# Patient Record
Sex: MALE | Race: WHITE | NOT HISPANIC OR LATINO | Employment: FULL TIME | URBAN - NONMETROPOLITAN AREA
[De-identification: names, ages, dates, MRNs, and addresses within clinical notes are randomized per-mention and may not be internally consistent; named-entity substitution may affect disease eponyms.]

---

## 2017-01-05 ENCOUNTER — OFFICE VISIT (OUTPATIENT)
Dept: ORTHOPEDIC SURGERY | Facility: CLINIC | Age: 47
End: 2017-01-05

## 2017-01-05 DIAGNOSIS — M77.12 LATERAL EPICONDYLITIS OF LEFT ELBOW: Primary | ICD-10-CM

## 2017-01-05 PROCEDURE — 99213 OFFICE O/P EST LOW 20 MIN: CPT | Performed by: ORTHOPAEDIC SURGERY

## 2017-01-05 NOTE — PROGRESS NOTES
Chief Complaint   Patient presents with   • Left Elbow - Follow-up           HPI The patient is here today for left elbow follow up on his MRI results.   The patient continues to have a lot of pain and discomfort on the medial aspect of the left elbow. He states that simple activities such as lifting heavy objects or even golfing for recreation are very painful for him. The pain is sharp and stabbing in character and radiates down into the proximal bottom of the forearm. He is having some numbness and tingling pertaining to the ulnar nerve as well. His  strength is also getting somewhat compromised. He does not have any signs or symptoms of ulnar nerve subluxation.     The patient’s MRI report is back. It shows medial epicondylitis with some mild thickening of the common flexor tendon origin with some intrasubstance partial tearing. There is a slightly less pronounced lateral epicondylar inflammatory changes. There is no tear of either the ulnar collateral ligament or the lateral collateral ligament complex. I have discussed the MRI findings with the patient in full and great detail.            There were no vitals filed for this visit.        Review of Systems   Constitutional: Negative for activity change, appetite change, fatigue, fever and unexpected weight change.   HENT: Negative for congestion, sneezing and voice change.    Eyes: Negative for visual disturbance.   Respiratory: Negative for cough, chest tightness and wheezing.    Cardiovascular: Negative for chest pain, palpitations and leg swelling.   Gastrointestinal: Negative for abdominal distention, constipation, diarrhea and vomiting.   Endocrine: Negative for polydipsia, polyphagia and polyuria.   Genitourinary: Negative for decreased urine volume, difficulty urinating, dysuria, flank pain and frequency.   Musculoskeletal: Positive for myalgias. Negative for arthralgias, back pain, gait problem, joint swelling, neck pain and neck stiffness.   Skin:  Negative for color change, pallor and wound.   Allergic/Immunologic: Negative for environmental allergies and food allergies.   Neurological: Negative for dizziness, weakness and headaches.   Hematological: Does not bruise/bleed easily.   Psychiatric/Behavioral: Positive for sleep disturbance. Negative for agitation, confusion and decreased concentration. The patient is not nervous/anxious.            Physical Exam   Constitutional: He is oriented to person, place, and time. He appears well-developed and well-nourished.   HENT:   Head: Normocephalic.   Eyes: Conjunctivae are normal. Pupils are equal, round, and reactive to light.   Neck: Normal range of motion. Neck supple. No JVD present.   Cardiovascular: Normal rate, normal heart sounds and intact distal pulses.    Pulmonary/Chest: Effort normal and breath sounds normal. No respiratory distress.   Abdominal: Soft. Bowel sounds are normal. There is no tenderness. There is no guarding.   Musculoskeletal: He exhibits tenderness.   Lymphadenopathy:     He has no cervical adenopathy.   Neurological: He is alert and oriented to person, place, and time. He has normal reflexes.   Skin: Skin is warm and dry.   Psychiatric: His behavior is normal. Judgment and thought content normal.   Vitals reviewed.          Joint/Body Part Specific Exam:  left elbow. medial Relationship of 3 bony points is well preserved. There is no evidence of posterior interosseous nerve palsy. Mild effusion is noted of the elbow. There is no ulnar neuritis. Patient has a lot of pain and tenderness over the lateral/medial aspect of the elbow. Range of motion of the elbow is 0-130 degrees of flexion, 0-90 degrees of pronation, 0-90 degrees of supination. Extension of the wrist against resistance bothers the patient significantly. Radial artery pulses are palpable. Skin and soft tissues are slightly swollen. There is point tenderness over the flexor pronator muscle mass/extensor supinator muscle  mass.      X-RAY Report:        Diagnostics:  MRI reports discussed with patient in detail      Vu was seen today for follow-up.    Diagnoses and all orders for this visit:    Lateral epicondylitis of left elbow  -     Ambulatory Referral to Hand Surgery          Procedures        Plan:     The patient has tried injections and antiinflammatory medication before. He has also tried using a supportive brace and modifying his activities. His symptoms continue to bother him very significantly. The patient wants to be referred to a specialist for possible surgical repair of the defect on the side that he has partial tear. Accordingly, I am referring because of his preference to Dr. Andrew Mata. The possibility of surgical repair of the partial intrasubstance tear discussed with the patient. He understands and accepts that. Home based exercise program. Stretching and strengthening exercises. Avoid repetitive loading. Tablets of Ibuprofen 600 mg, take 1 p.o. b.i.d. p.r.n. pain. Follow up in my office in 3 months. Copies of the MRI report given to the patient.

## 2017-01-05 NOTE — MR AVS SNAPSHOT
Vu Edge   2017 2:00 PM   Office Visit    Dept Phone:  [phone]   Encounter #:  [id number]    Provider:  aPrish Hathaway MD   Department:  [facility]                Your Full Care Plan              Your Updated Medication List          This list is accurate as of: 17  2:49 PM.  Always use your most recent med list.                VOLTAREN 1 % gel gel   Generic drug:  diclofenac               We Performed the Following     Ambulatory Referral to Hand Surgery       You Were Diagnosed With        Codes Comments    Lateral epicondylitis of left elbow    -  Primary ICD-10-CM: M77.12  ICD-9-CM: 726.32       Instructions     None    Patient Instructions History      Upcoming Appointments     Visit Type Date Time Department    FO[facility] UP 2017  2:00 PM [facility]    FOLLOW UP 2017  4:00 PM [facility]      [employer] Signup     [facility] allows you to send messages to your doctor, view your test results, renew your prescriptions, schedule appointments, and more. To sign up, go to [employer] and click on the Sign Up Now link in the New User? box. Enter your divorce[employer] Activation Code exactly as it appears below along with the last four digits of your Social Security Number and your Date of Birth () to complete the sign-up process. If you do not sign up before the expiration date, you must request a new code.    [email or web address] Activation Code: [email or web address]  Expires: 2017  2:49 PM    If you have questions, you can email [email or web address] or call [phone] to talk to our [email or web address] staff. Remember, divorce360 is NOT to be used for urgent needs. For medical emergencies, dial 911.               Other Info from Your Visit           Your Appointments     2017  4:00 PM EDT   Follow Up with Parish Hathaway MD   [facility] (--)    [address] [address]   [phone]           Arrive 15 minutes prior to appointment.              Allergies     No Known Allergies      Reason for Visit     Left Elbow - Follow-up           Vital Signs     Smoking Status                   Never Smoker           Problems and Diagnoses Noted     Tennis elbow

## 2019-01-29 ENCOUNTER — OFFICE VISIT CONVERTED (OUTPATIENT)
Dept: FAMILY MEDICINE CLINIC | Age: 49
End: 2019-01-29
Attending: NURSE PRACTITIONER

## 2019-02-02 ENCOUNTER — HOSPITAL ENCOUNTER (OUTPATIENT)
Dept: OTHER | Facility: HOSPITAL | Age: 49
Discharge: HOME | End: 2019-02-02
Attending: NURSE PRACTITIONER

## 2019-02-02 LAB
ALBUMIN SERPL-MCNC: 4.1 G/DL (ref 3.5–5)
ALBUMIN/GLOB SERPL: 1.5 {RATIO} (ref 1.4–2.6)
ALP SERPL-CCNC: 30 U/L (ref 53–128)
ALT SERPL-CCNC: 22 U/L (ref 10–40)
ANION GAP SERPL CALC-SCNC: 13 MMOL/L (ref 8–19)
AST SERPL-CCNC: 30 U/L (ref 15–50)
BILIRUB SERPL-MCNC: 0.8 MG/DL (ref 0.2–1.3)
BUN SERPL-MCNC: 18 MG/DL (ref 5–25)
BUN/CREAT SERPL: 14 {RATIO} (ref 6–20)
CALCIUM SERPL-MCNC: 8.9 MG/DL (ref 8.7–10.4)
CHLORIDE SERPL-SCNC: 102 MMOL/L (ref 99–111)
CHOLEST SERPL-MCNC: 141 MG/DL (ref 107–200)
CHOLEST/HDLC SERPL: 4 {RATIO} (ref 3–6)
CONV CO2: 27 MMOL/L (ref 22–32)
CONV TOTAL PROTEIN: 6.8 G/DL (ref 6.3–8.2)
CREAT UR-MCNC: 1.3 MG/DL (ref 0.7–1.2)
ERYTHROCYTE [DISTWIDTH] IN BLOOD BY AUTOMATED COUNT: 12.7 % (ref 11.5–14.5)
GFR SERPLBLD BASED ON 1.73 SQ M-ARVRAT: >60 ML/MIN/{1.73_M2}
GLOBULIN UR ELPH-MCNC: 2.7 G/DL (ref 2–3.5)
GLUCOSE SERPL-MCNC: 98 MG/DL (ref 70–99)
HBA1C MFR BLD: 17 G/DL (ref 14–18)
HCT VFR BLD AUTO: 48.6 % (ref 42–52)
HDLC SERPL-MCNC: 35 MG/DL (ref 40–60)
LDLC SERPL CALC-MCNC: 92 MG/DL (ref 70–100)
MCH RBC QN AUTO: 31 PG (ref 27–31)
MCHC RBC AUTO-ENTMCNC: 35 G/DL (ref 33–37)
MCV RBC AUTO: 88.5 FL (ref 80–96)
OSMOLALITY SERPL CALC.SUM OF ELEC: 288 MOSM/KG (ref 273–304)
PLATELET # BLD AUTO: 209 10*3/UL (ref 130–400)
PMV BLD AUTO: 9.6 FL (ref 7.4–10.4)
POTASSIUM SERPL-SCNC: 4.3 MMOL/L (ref 3.5–5.3)
RBC # BLD AUTO: 5.49 10*6/UL (ref 4.7–6.1)
SODIUM SERPL-SCNC: 138 MMOL/L (ref 135–147)
TRIGL SERPL-MCNC: 72 MG/DL (ref 40–150)
TSH SERPL-ACNC: 1.87 M[IU]/L (ref 0.27–4.2)
VLDLC SERPL-MCNC: 14 MG/DL (ref 5–37)
WBC # BLD AUTO: 5.91 10*3/UL (ref 4.8–10.8)

## 2019-03-14 ENCOUNTER — OFFICE VISIT CONVERTED (OUTPATIENT)
Dept: FAMILY MEDICINE CLINIC | Age: 49
End: 2019-03-14
Attending: NURSE PRACTITIONER

## 2020-06-18 ENCOUNTER — OFFICE VISIT CONVERTED (OUTPATIENT)
Dept: FAMILY MEDICINE CLINIC | Age: 50
End: 2020-06-18
Attending: NURSE PRACTITIONER

## 2020-06-18 ENCOUNTER — HOSPITAL ENCOUNTER (OUTPATIENT)
Dept: OTHER | Facility: HOSPITAL | Age: 50
Discharge: HOME | End: 2020-06-18
Attending: NURSE PRACTITIONER

## 2020-06-18 LAB
ALBUMIN SERPL-MCNC: 4.3 G/DL (ref 3.5–5)
ALBUMIN/GLOB SERPL: 1.5 {RATIO} (ref 1.4–2.6)
ALP SERPL-CCNC: 36 U/L (ref 53–128)
ALT SERPL-CCNC: 21 U/L (ref 10–40)
ANION GAP SERPL CALC-SCNC: 18 MMOL/L (ref 8–19)
AST SERPL-CCNC: 28 U/L (ref 15–50)
BASOPHILS # BLD MANUAL: 0.04 10*3/UL (ref 0–0.2)
BASOPHILS NFR BLD MANUAL: 0.7 % (ref 0–3)
BILIRUB SERPL-MCNC: 1.07 MG/DL (ref 0.2–1.3)
BUN SERPL-MCNC: 22 MG/DL (ref 5–25)
BUN/CREAT SERPL: 18 {RATIO} (ref 6–20)
CALCIUM SERPL-MCNC: 9.4 MG/DL (ref 8.7–10.4)
CHLORIDE SERPL-SCNC: 101 MMOL/L (ref 99–111)
CHOLEST SERPL-MCNC: 142 MG/DL (ref 107–200)
CHOLEST/HDLC SERPL: 2.8 {RATIO} (ref 3–6)
CONV CO2: 24 MMOL/L (ref 22–32)
CONV TOTAL PROTEIN: 7.2 G/DL (ref 6.3–8.2)
CREAT UR-MCNC: 1.19 MG/DL (ref 0.7–1.2)
DEPRECATED RDW RBC AUTO: 41.2 FL
EOSINOPHIL # BLD MANUAL: 0.07 10*3/UL (ref 0–0.7)
EOSINOPHIL NFR BLD MANUAL: 1.3 % (ref 0–7)
ERYTHROCYTE [DISTWIDTH] IN BLOOD BY AUTOMATED COUNT: 12.6 % (ref 11.5–14.5)
GFR SERPLBLD BASED ON 1.73 SQ M-ARVRAT: >60 ML/MIN/{1.73_M2}
GLOBULIN UR ELPH-MCNC: 2.9 G/DL (ref 2–3.5)
GLUCOSE SERPL-MCNC: 93 MG/DL (ref 70–99)
GRANS (ABSOLUTE): 3.14 10*3/UL (ref 2–8)
GRANS: 57.7 % (ref 30–85)
HBA1C MFR BLD: 15.6 G/DL (ref 14–18)
HCT VFR BLD AUTO: 44.7 % (ref 42–52)
HDLC SERPL-MCNC: 50 MG/DL (ref 40–60)
IMM GRANULOCYTES # BLD: 0.01 10*3/UL (ref 0–0.54)
IMM GRANULOCYTES NFR BLD: 0.2 % (ref 0–0.43)
LDLC SERPL CALC-MCNC: 78 MG/DL (ref 70–100)
LYMPHOCYTES # BLD MANUAL: 1.66 10*3/UL (ref 1–5)
LYMPHOCYTES NFR BLD MANUAL: 9.6 % (ref 3–10)
MCH RBC QN AUTO: 30.9 PG (ref 27–31)
MCHC RBC AUTO-ENTMCNC: 34.9 G/DL (ref 33–37)
MCV RBC AUTO: 88.5 FL (ref 80–96)
MONOCYTES # BLD AUTO: 0.52 10*3/UL (ref 0.2–1.2)
OSMOLALITY SERPL CALC.SUM OF ELEC: 291 MOSM/KG (ref 273–304)
PLATELET # BLD AUTO: 200 10*3/UL (ref 130–400)
PMV BLD AUTO: 9.1 FL (ref 7.4–10.4)
POTASSIUM SERPL-SCNC: 4.2 MMOL/L (ref 3.5–5.3)
PSA SERPL-MCNC: 0.83 NG/ML (ref 0–4)
RBC # BLD AUTO: 5.05 10*6/UL (ref 4.7–6.1)
SODIUM SERPL-SCNC: 139 MMOL/L (ref 135–147)
TRIGL SERPL-MCNC: 69 MG/DL (ref 40–150)
TSH SERPL-ACNC: 2.72 M[IU]/L (ref 0.27–4.2)
VARIANT LYMPHS NFR BLD MANUAL: 30.5 % (ref 20–45)
VLDLC SERPL-MCNC: 14 MG/DL (ref 5–37)
WBC # BLD AUTO: 5.44 10*3/UL (ref 4.8–10.8)

## 2020-06-19 LAB
C TRACH RRNA CVX QL NAA+PROBE: NOT DETECTED
CONV HIV-1/ HIV-2: NONREACTIVE
HSV1 IGG SER IA-ACNC: 39.3 INDEX (ref 0–0.9)
HSV2 IGG SER IA-ACNC: <0.91 INDEX (ref 0–0.9)
N GONORRHOEA DNA SPEC QL NAA+PROBE: NOT DETECTED
RPR SER QL: ABNORMAL

## 2020-06-22 LAB
SHBG SERPL-SCNC: 30.9 NMOL/L (ref 16.5–55.9)
TESTOST SERPL-MCNC: 433 NG/DL (ref 264–916)
TESTOSTERONE, FREE: 17.2 PG/ML (ref 6.8–21.5)

## 2021-05-18 NOTE — PROGRESS NOTES
Vu Edge 1970     Office/Outpatient Visit    Visit Date: Tue, Jan 29, 2019 03:07 pm    Provider: Vielka Muro N.P. (Assistant: Sarah Spurling, MA)    Location: [facility]        Electronically signed by Vielka Muro N.P. on  01/31/2019 08:28:37 AM                             SUBJECTIVE:        CC:     Mr. Edge is a 48 year old White male.  Prevenative Exam.          HPI:         Patient to be evaluated for health checkup.  His last physical exam was 5 years ago.  He performs testicular self-exams occasionally.  He is current with his Td and hep a and hep b immunization.      Smoking status: Former smoker         PHQ-9 Depression Screening: Completed form scanned and in chart; Total Score 2 Alcohol Consumption Screening: Completed form scanned and in chart; Total Score 2     ROS:     CONSTITUTIONAL:  Positive for fatigue ( improved with tx from 25 again but having acne on back and face ).      E/N/T:  Negative for hearing problems, E/N/T pain, congestion, rhinorrhea, epistaxis, hoarseness, and dental problems.      CARDIOVASCULAR:  Negative for chest pain, palpitations, tachycardia, orthopnea, and edema.      RESPIRATORY:  Negative for cough, dyspnea, and hemoptysis.      GASTROINTESTINAL:  Negative for abdominal pain, heartburn, constipation, diarrhea, and stool changes.      GENITOURINARY:  Negative for dysuria, genital lesions, hematuria, impotence, polyuria, and changes in urine stream.      MUSCULOSKELETAL:  Negative for arthralgias, back pain, and myalgias.      INTEGUMENTARY:  Positive for acne.      NEUROLOGICAL:  Negative for dizziness, headaches, paresthesias, and weakness.      ENDOCRINE:  Negative for hair loss, heat/cold intolerance, polydipsia, and polyphagia.      PSYCHIATRIC:  Negative for anxiety, depression, and sleep disturbances.          PMH/FMH/SH:     Last Reviewed on 1/29/2019 03:26 PM by Vielka Muro    Past Medical History:             PAST MEDICAL HISTORY          mild hyperlipidemia         Surgical History:         Positive for    Cholecystectomy: 1997; and    Tonsillectomy/Adenoidectomy; ;         Family History:     Father: Hypertension     Paternal Grandfather: Myocardial Infarction ( age 60 )     paternal uncle esophageal cancer non smoker         Social History:         Marital Status:      Children: 2 children         Tobacco/Alcohol/Supplements:     Last Reviewed on 1/29/2019 03:16 PM by Spurling, Sarah C    Tobacco: He has a past history of cigarette smoking; quit date:  1995.              Current Problems:     Acne     Screening for cholesterol level     Cerumen impaction     Screening for depression         Immunizations:     None        Allergies:     Last Reviewed on 12/17/2015 02:18 PM by Isabel Rousseau      No Known Drug Allergies.         Current Medications:     Last Reviewed on 1/29/2019 03:19 PM by Spurling, Sarah C    Liothyronine Sodium 5mcg Tablet Take 1 tablet(s) by mouth daily     Levothyroxine Sodium 50mcg Capsules 1 tablet daily     Presciption testosterone cream 2x a day         OBJECTIVE:        Vitals:         Historical:     12/17/2015  BP:   99/59 mm Hg ( (right arm, , standing, );)     12/17/2015  Wt:   185lbs        Current: 1/29/2019 3:22:28 PM    Ht:  5 ft, 11 in;  Wt: 212.2 lbs;  BMI: 29.6    T: 98.4 F (oral);  BP: 118/67 mm Hg (left arm, standing);  P: 65 bpm (left arm (BP Cuff), standing);  sCr: 1.05 mg/dL;  GFR: 91.91        Exams:     PHYSICAL EXAM:     GENERAL:  well developed and nourished; appropriately groomed; in no apparent distress;     EYES: PERRL, EOMI     E/N/T: EARS: external auditory canal occluded by cerumen bilaterally;  NOSE: normal nasal mucosa; OROPHARYNX: posterior pharynx, including tonsils, tongue, and uvula are normal;     RESPIRATORY: normal respiratory rate and pattern with no distress; normal breath sounds with no rales, rhonchi, wheezes or rubs;     CARDIOVASCULAR: normal rate; rhythm is  regular;  no edema;     GASTROINTESTINAL: nontender; normal bowel sounds;     GENITOURINARY: prostate: DECLINES     LYMPHATIC: no enlargement of cervical or facial nodes;     SKIN: acne; 3 areas on face;     MUSCULOSKELETAL:  Normal range of motion, strength and tone;     NEUROLOGIC: mental status: alert and oriented x 3; GROSSLY INTACT     PSYCHIATRIC:  appropriate affect and demeanor; normal speech pattern; grossly normal memory;         Procedures:     Cerumen impaction         Cerumen/Wax removal: Cerumen impaction is noted in both ears The degree of wax accumulation is moderate in the left ear and right ear.  With moderate dificulty, using a syringe irrigation and ear currette, the wax is removed.  Removed from ear was hard balls of wax.  The patient tolerated the procedure well.      There were no complications.  Performed by: Sarah Spurling RMA         Tdap vaccination     1. Tdap: 0.5 ml unit dose given IM in the right upper arm; administered by SCS;  lot number [id number]; expires 4-3-21             ASSESSMENT           V70.0   Z00.00  Health checkup              DDx:     V79.0   Z13.89  Screening for depression              DDx:     380.4   H61.23  Cerumen impaction              DDx:     V06.1   Z23  Tdap vaccination              DDx:     706.1   L70.0  Acne              DDx:         ORDERS:         Meds Prescribed:       Clindamycin Phosphate 1% Topical Solution Apply thin film to affected area daily  #60 (Sixty) ml Refills: 1         Lab Orders:       18647  BDCB2 - [facility] CBC w/o diff  (Send-Out)         60771  COMP - [facility] Comp. Metabolic Panel  (Send-Out)         47405  LPDP - [facility] Lipid Panel  (Send-Out)         31226  TSH - [facility] TSH  (Send-Out)           Procedures Ordered:       [id number]  Removal of impacted cerumen right ear (NURSE)  (In-House)         68902  Tdap vaccine, when administered to individuals 7 years or older, for intramuscular use  (In-House)         49930  Immunization administration; one  vaccine  (In-House)           Other Orders:       [id number]  Removal of impacted cerumen left ear (NURSE)  (In-House)           Depression screen negative  (In-House)                   PLAN:          Health checkup     LABORATORY:  Labs ordered to be performed today include CBC W/O DIFF, Comprehensive metabolic panel, lipid panel, and TSH.            Orders:       71914  BDCB2 - [facility] CBC w/o diff  (Send-Out)         97179  COMP - [facility] Comp. Metabolic Panel  (Send-Out)         25451  LPDP - [facility] Lipid Panel  (Send-Out)         38007  TSH - [facility] TSH  (Send-Out)             Patient Education Handouts:       Physical Exam 40-49 year, Male           Screening for depression     MIPS PHQ-9 Depression Screening Completed form scanned and in chart; Total Score 2           Orders:         Depression screen negative  (In-House)            Cerumen impaction           Orders:       [id number]  Removal of impacted cerumen left ear (NURSE)  (In-House)         98542HK  Removal of impacted cerumen right ear (NURSE)  (In-House)            Tdap vaccination           Orders:       64571  Tdap vaccine, when administered to individuals 7 years or older, for intramuscular use  (In-House)         57307  Immunization administration; one vaccine  (In-House)            Acne           Prescriptions:       Clindamycin Phosphate 1% Topical Solution Apply thin film to affected area daily  #60 (Sixty) ml Refills: 1             CHARGE CAPTURE           **Please note: ICD descriptions below are intended for billing purposes only and may not represent clinical diagnoses**        Primary Diagnosis:         V70.0 Health checkup            Z00.00    Encounter for general adult medical examination without abnormal findings              Orders:          25796   Preventive medicine, established patient, age 40-64 years  (In-House)           V79.0 Screening for depression            Z13.89    Encounter for screening for other disorder               Orders:             Depression screen negative  (In-House)           380.4 Cerumen impaction            H61.23    Impacted cerumen, bilateral              Orders:          [id number]   Removal of impacted cerumen left ear (NURSE)  (In-House)             [id number]   Removal of impacted cerumen right ear (NURSE)  (In-House)           V06.1 Tdap vaccination            Z23    Encounter for immunization              Orders:          18151   Tdap vaccine, when administered to individuals 7 years or older, for intramuscular use  (In-House)             82315   Immunization administration; one vaccine  (In-House)           706.1 Acne            L70.0    Acne vulgaris

## 2021-05-18 NOTE — PROGRESS NOTES
Vu EdgeIvania 1970     Office/Outpatient Visit    Visit Date: Thu, Mar 14, 2019 04:04 pm    Provider: Vielka Muro N.P. (Assistant: Sarah Spurling, MA)    Location: [facility]        Electronically signed by Vielka Muro N.P. on  03/14/2019 10:12:28 PM                             SUBJECTIVE:        CC:     Mr. Edge is a 48 year old White male.  Pt wants to talk about his meds with you, but he said that he is ready to get rid of this acne. He has it worse on his back and he said that he has it on his face as well, but the beard helps cover it up. He said he had acne as a teen and then he said it came back early thirties, and he was put on acutane for 20weeks and then he used proactiv and he said that would work some, but since he has been on test he has gotten it more.          HPI:         Patient to be evaluated for acne.  It is most prominent about the cheeks and upper back.  Predominate pattern is whiteheads and pustules.  Mr. Edge is here for a follow-up acne visit.  Current treatment includes topical clindamycin.  course of accutane in the past.  currently on topical testosterone per 25 again.          With regard to the fatigue, other details: inconvenient and expensive getting 25 again to prescribe thyroid medication and testosterone cream.  would like to get these prescribed here.  he does not have a clinical underactive thyroid and his testosterone levels were normal for his age.  he likes that these tx provide more energy..      ROS:     CONSTITUTIONAL:  Positive for fatigue ( not current ).   Negative for fever.      CARDIOVASCULAR:  Negative for chest pain, palpitations, tachycardia, orthopnea, and edema.      RESPIRATORY:  Negative for cough, dyspnea, and hemoptysis.      GASTROINTESTINAL:  Negative for abdominal pain, heartburn, constipation, diarrhea, and stool changes.      MUSCULOSKELETAL:  Negative for arthralgias, back pain, and myalgias.      INTEGUMENTARY:  Positive  for acne.      NEUROLOGICAL:  Negative for dizziness, headaches, paresthesias, and weakness.      ENDOCRINE:  Negative for hair loss, heat/cold intolerance, polydipsia, and polyphagia.      PSYCHIATRIC:  Negative for anxiety, depression, and sleep disturbances.          PMH/FMH/SH:     Last Reviewed on 1/29/2019 03:26 PM by Vielka Muro    Past Medical History:             PAST MEDICAL HISTORY         mild hyperlipidemia         Surgical History:         Positive for    Cholecystectomy: 1997; and    Tonsillectomy/Adenoidectomy; ;         Family History:     Father: Hypertension     Paternal Grandfather: Myocardial Infarction ( age 60 )     paternal uncle esophageal cancer non smoker         Social History:         Marital Status:      Children: 2 children         Tobacco/Alcohol/Supplements:     Last Reviewed on 1/29/2019 03:16 PM by Spurling, Sarah C    Tobacco: He has a past history of cigarette smoking; quit date:  1995.              Current Problems:     Acne     Screening for cholesterol level     Cerumen impaction     Screening for depression         Immunizations:     Tdap (Tetanus, reduced diph, acellular pertussis) 1/29/2019         Allergies:     Last Reviewed on 1/29/2019 03:16 PM by Spurling, Sarah C      No Known Drug Allergies.         Current Medications:     Last Reviewed on 1/29/2019 03:19 PM by Spurling, Sarah C    Liothyronine Sodium 5mcg Tablet Take 1 tablet(s) by mouth daily     Clindamycin Phosphate 1% Topical Solution Apply thin film to affected area daily     Levothyroxine Sodium 50mcg Capsules 1 tablet daily     Presciption testosterone cream 2x a day         OBJECTIVE:        Vitals:         Historical:     01/29/2019  BP:   118/67 mm Hg ( (left arm, , standing, );)     01/29/2019  Wt:   212.2lbs        Current: 3/14/2019 4:11:06 PM    Ht:  5 ft, 11 in;  Wt: 200.8 lbs;  BMI: 28.0    T: 98.6 F (oral);  BP: 126/67 mm Hg (left arm, standing);  P: 54 bpm (left arm (BP Cuff),  standing);  sCr: 1.3 mg/dL;  GFR: 72.51        Exams:     PHYSICAL EXAM:     GENERAL:  well developed and nourished; appropriately groomed; in no apparent distress;     NECK: thyroid is non-palpable;     RESPIRATORY: normal respiratory rate and pattern with no distress; normal breath sounds with no rales, rhonchi, wheezes or rubs;     CARDIOVASCULAR: normal rate; rhythm is regular;     SKIN: acne of back; acne lower cheeks;     MUSCULOSKELETAL:  Normal range of motion, strength and tone;     NEUROLOGIC: mental status: alert and oriented x 3; GROSSLY INTACT     PSYCHIATRIC:  appropriate affect and demeanor; normal speech pattern; grossly normal memory;         ASSESSMENT           706.1   L70.0  Acne              DDx:     780.79   R53.83  Fatigue              DDx:     V77.0   Z13.29  Screening for thyroid disorder              DDx:     796.4   R68.89  Abnormal laboratory test findings without diagnosis              DDx:         ORDERS:         Meds Prescribed:       Doxycycline Monohydrate 100mg Tablet Take 1 tablet(s) by mouth daily  #30 (Thirty) tablet(s) Refills: 0         Lab Orders:       FUTURE  Future order to be done at patients convenience  (Send-Out)         24693  [facility] TSH  (Send-Out)         FUTURE  Future order to be done at patients convenience  (Send-Out)         53118  [facility] Basic Metabolic Panel  (Send-Out)                   PLAN:          Acne         RECOMMENDATIONS given include: acne can be side effect of testosterone (androgenic effect).  use acne wash with saliclylic acid or benzoyl peroxide on areas when he showers..      take doxycycline with food and not within 2 hrs of milk.            Prescriptions:       Doxycycline Monohydrate 100mg Tablet Take 1 tablet(s) by mouth daily  #30 (Thirty) tablet(s) Refills: 0          Fatigue         he ran out of testosterone 1-2 wks ago.  I do not recommend testosterone or thyroid medication without a clinical deficiency.  I am not able to rx  testosterone products.  he has never had an elevated tsh.  I will treat with thyroid medication if he exhibits an elevated tsh.  took last thyroid tablet today.  he will return for labs in 2 months.  also needs recheck of mildly elevated creatinine.           Screening for thyroid disorder         FOLLOW-UP TESTING #1: FOLLOW-UP LABORATORY:  Labs to be scheduled in the future include TSH.   Patient to schedule in 2 months.            Orders:       FUTURE  Future order to be done at patients convenience  (Send-Out)         32798  TSH - [facility] TSH  (Send-Out)            Abnormal laboratory test findings without diagnosis         FOLLOW-UP TESTING #1: FOLLOW-UP LABORATORY:  Labs to be scheduled in the future include BMP.   Patient to schedule in 2 months.            Orders:       FUTURE  Future order to be done at patients convenience  (Send-Out)         15910  BMP - [facility] Basic Metabolic Panel  (Send-Out)               Patient Recommendations:        For  Screening for thyroid disorder:             The following laboratory testing has been ordered: TSH Schedule the above testing in 2 months.          For  Abnormal laboratory test findings without diagnosis:             The following laboratory testing has been ordered: metabolic panel, basic Schedule the above testing in 2 months.              CHARGE CAPTURE           **Please note: ICD descriptions below are intended for billing purposes only and may not represent clinical diagnoses**        Primary Diagnosis:         706.1 Acne            L70.0    Acne vulgaris              Orders:          28145   Office/outpatient visit; established patient, level 3  (In-House)           780.79 Fatigue            R53.83    Other fatigue    V77.0 Screening for thyroid disorder            Z13.29    Encounter for screening for other suspected endocrine disorder    796.4 Abnormal laboratory test findings without diagnosis            R68.89    Other general symptoms and signs

## 2021-05-18 NOTE — PROGRESS NOTES
Vu Edge  1970     Office/Outpatient Visit    Visit Date: Thu, Jun 18, 2020 09:00 am    Provider: Vielka Muro N.P. (Assistant: Spurling, Sarah C, MA)    Location: [facility]        Electronically signed by Vielka Muro N.P. on  06/18/2020 03:14:09 PM                             Subjective:        CC: Mr. Edge is a 50 year old White male.  Pt also has a rash under his belly button he wants checked out. He said that he has went through a divorce and is sexually active again so he wants to make sure it isn't anything.          HPI:           Patient to be evaluated for encounter for general adult medical examination without abnormal findings.  His last physical exam was last year.  He has never had a flexible sigmoidoscopy or colonoscopy. He's had vision screening done unknown years ago and this was normal.  He is current with his Td immunization.            PHQ-9 Depression Screening: Completed form scanned and in chart; Total Score 2           Dx with rash and other nonspecific skin eruption; this has been a problem for the past 2 months.  The rash has not occurred previously.  It is located primarily suprapubic.  He describes the rash as red, papular, and one single flesh colored raised area.  The rash has been essentially asymptomatic.  He denies any associated symptoms.  Possible contributing factors include new partner.  Pertinent past medical history is unremarkable.      ROS:     CONSTITUTIONAL:  Positive for fatigue ( mild ).   Negative for chills or fever.      EYES:  Negative for blurred vision, eye pain, and photophobia.      E/N/T:  Negative for hearing problems, E/N/T pain, congestion, rhinorrhea, epistaxis, hoarseness, and dental problems.      CARDIOVASCULAR:  Negative for chest pain, palpitations, tachycardia, orthopnea, and edema.      RESPIRATORY:  Negative for cough, dyspnea, and hemoptysis.      GASTROINTESTINAL:  Negative for abdominal pain, heartburn,  constipation, diarrhea, and stool changes.      GENITOURINARY:  Positive for high risk sexual behavior and erectile dysfunction.   Negative for dysuria, history of recurrent UTIs, urinary incontinence or change in urine stream.      MUSCULOSKELETAL:  Negative for back pain, limb pain and myalgias.      INTEGUMENTARY:  Positive for rash.      NEUROLOGICAL:  Negative for dizziness, headaches, paresthesias, and weakness.      ENDOCRINE:  Negative for hair loss, heat/cold intolerance, polydipsia, and polyphagia.      PSYCHIATRIC:  Positive for feelings of stress and insomnia.   Negative for anxiety or depression.          Past Medical History / Family History / Social History:         Last Reviewed on 6/18/2020 09:13 AM by Vielka Muro    Past Medical History:             PAST MEDICAL HISTORY         mild hyperlipidemia         Surgical History:         Positive for    Cholecystectomy: 1997; and    Tonsillectomy/Adenoidectomy; ;         Family History:     Father: Hypertension     Paternal Grandfather: Myocardial Infarction ( age 60 )     paternal uncle esophageal cancer non smoker         Social History:         Marital Status:      Children: 2 children         Tobacco/Alcohol/Supplements:     Last Reviewed on 6/18/2020 09:05 AM by Spurling, Sarah C    Tobacco: He has a past history of cigarette smoking; quit date:  1995.          Current Problems:     Last Reviewed on 6/18/2020 09:01 AM by Spurling, Sarah C    Encounter for screening for lipoid disorders    Acne vulgaris    Other fatigue    Other general symptoms and signs        Immunizations:     Tdap (Tetanus, reduced diph, acellular pertussis) 1/29/2019        Allergies:     Last Reviewed on 6/18/2020 09:01 AM by Spurling, Sarah C    No Known Allergies.        Current Medications:     Last Reviewed on 6/18/2020 09:05 AM by Spurling, Sarah C    No Known Medications.        Objective:        Vitals:         Historical:     3/14/2019  BP:   126/67 mm Hg (  (left arm, , standing, );) 3/14/2019  Wt:   200.8lbs    Current: 6/18/2020 9:08:55 AM    Ht:  5 ft, 11 in;  Wt: 189.8 lbs;  BMI: 26.5T: 97.7 F (oral);  BP: 104/61 mm Hg (left arm, standing);  P: 63 bpm (left arm (BP Cuff), standing);  sCr: 1.3 mg/dL;  GFR: 69.33        Exams:     PHYSICAL EXAM:     GENERAL:  well developed and nourished; appropriately groomed; in no apparent distress;     EYES: PERRL, EOMI     E/N/T: EARS: bilateral TMs are normal;  NOSE: normal nasal mucosa; OROPHARYNX: posterior pharynx, including tonsils, tongue, and uvula are normal;     NECK: thyroid is non-palpable;     RESPIRATORY: normal respiratory rate and pattern with no distress; normal breath sounds with no rales, rhonchi, wheezes or rubs;     CARDIOVASCULAR: normal rate; rhythm is regular;  no edema;     GASTROINTESTINAL: nontender; normal bowel sounds; no organomegaly;     GENITOURINARY: prostate: DECLINES visual exam of suprapubic area only; Chaperone: declines     SKIN: a rash is noted suprapubic area;  the color is mainly red;  it is best characterized as papular and maculopapular;  one single flesh colored 4 mm area superior right testicle;     MUSCULOSKELETAL:  Normal range of motion, strength and tone;     NEUROLOGIC: mental status: alert and oriented x 3; GROSSLY INTACT     PSYCHIATRIC:  appropriate affect and demeanor; normal speech pattern; grossly normal memory;         Assessment:         Z00.00   Encounter for general adult medical examination without abnormal findings       Z13.31   Encounter for screening for depression       R21   Rash and other nonspecific skin eruption       Z72.51   High risk heterosexual behavior       R53.83   Other fatigue           ORDERS:         Meds Prescribed:       [New Rx] Bactrim -160 mg oral tablet [take 1 tablet by oral route 2 times per day], #14 (fourteen) tablets, Refills: 0 (zero)       [New Rx] nystatin-triamcinolone 100,000-0.1 unit/g-% Topical Cream [apply to the affected  area(s) by topical route 2 times per day in themorning and evening], #30 (thirty) grams, Refills: 0 (zero)         Lab Orders:       [id number]  BDSTD -[facility] STD PANEL: Herpes I & II, HIV, RPR, GC/CHLAMYDIA  (Send-Out)            29394  TFTSG - [facility] Testosterone, free and Total weakly bound  (Send-Out)            51214  [facility] MALE PHYSICAL: CMP, CBC,LIPID, PRSAS-, TSH 66811, 91519, 26100, 77004, , 29596  (Send-Out)              Other Orders:         Depression screen negative  (In-House)                      Plan:         Encounter for general adult medical examination without abnormal findings    LABORATORY:  Labs ordered to be performed today include MALE PHYSICAL: CMP, CBC, LIPID, PSA, TSH.            Orders:       [id number]  [facility] MALE PHYSICAL: CMP, CBC,LIPID, PRSAS-, TSH 51685, 78354, 74971, 25733, , 27325  (Send-Out)                Patient Education Handouts:       Physical Exam 40-49 year, Male          Encounter for screening for depression    MIPS PHQ-9 Depression Screening: Completed form scanned and in chart; Total Score 2; Negative Depression Screen           Orders:         Depression screen negative  (In-House)              Rash and other nonspecific skin eruption        tx for potential folliculitis due to shaving.  single flesh colored lesion does not look herpetic or wart like .  monitor closely.  he will call if any of these findings persist.            Prescriptions:       [New Rx] Bactrim -160 mg oral tablet [take 1 tablet by oral route 2 times per day], #14 (fourteen) tablets, Refills: 0 (zero)       [New Rx] nystatin-triamcinolone 100,000-0.1 unit/g-% Topical Cream [apply to the affected area(s) by topical route 2 times per day in themorning and evening], #30 (thirty) grams, Refills: 0 (zero)         High risk heterosexual behavior    LABORATORY:  Labs ordered to be performed today include STD Testing: STD Panel : HIV, HSV I & II,RPR, GC/Chlamydia.       RECOMMENDATIONS given include: abstain until lab results have returned and rash/ lesion has resolved.  always use condoms..            Orders:       43082  BDSTD -[facility] STD PANEL: Herpes I & II, HIV, RPR, GC/CHLAMYDIA  (Send-Out)                Patient Education Handouts:       Sexually Transmitted Diseases (STDs)        Venereal Warts (Condyloma Acuminata)          Other fatigue    LABORATORY:  Labs ordered to be performed today include Testosterone free and total.            Orders:       11282  [facility] Testosterone, free and Total weakly bound  (Send-Out)                  Charge Capture:         Primary Diagnosis:     Z00.00  Encounter for general adult medical examination without abnormal findings           Orders:      08533  Preventive medicine, established patient, age 40-64 years  (In-House)              Z13.31  Encounter for screening for depression           Orders:      05077-76  Office/outpatient visit; established patient, level 3  (In-House)              Depression screen negative  (In-House)              R21  Rash and other nonspecific skin eruption     Z72.51  High risk heterosexual behavior     R53.83  Other fatigue

## 2021-07-01 VITALS
DIASTOLIC BLOOD PRESSURE: 67 MMHG | BODY MASS INDEX: 29.71 KG/M2 | HEIGHT: 71 IN | TEMPERATURE: 98.4 F | HEART RATE: 65 BPM | WEIGHT: 212.2 LBS | SYSTOLIC BLOOD PRESSURE: 118 MMHG

## 2021-07-01 VITALS
BODY MASS INDEX: 28.11 KG/M2 | TEMPERATURE: 98.6 F | WEIGHT: 200.8 LBS | HEART RATE: 54 BPM | DIASTOLIC BLOOD PRESSURE: 67 MMHG | HEIGHT: 71 IN | SYSTOLIC BLOOD PRESSURE: 126 MMHG

## 2021-07-02 VITALS
SYSTOLIC BLOOD PRESSURE: 104 MMHG | BODY MASS INDEX: 26.57 KG/M2 | TEMPERATURE: 97.7 F | HEART RATE: 63 BPM | DIASTOLIC BLOOD PRESSURE: 61 MMHG | WEIGHT: 189.8 LBS | HEIGHT: 71 IN

## 2023-01-16 ENCOUNTER — HOSPITAL ENCOUNTER (OUTPATIENT)
Dept: PHYSICAL THERAPY | Facility: HOSPITAL | Age: 53
Setting detail: THERAPIES SERIES
Discharge: HOME | End: 2023-01-16
Payer: COMMERCIAL

## 2023-01-16 DIAGNOSIS — M54.2 NECK PAIN: Primary | ICD-10-CM

## 2023-01-16 PROCEDURE — 97161 PT EVAL LOW COMPLEX 20 MIN: CPT

## 2023-01-16 NOTE — THERAPY EVALUATION
Outpatient Physical Therapy Ortho Initial Evaluation  ALEXEY Armando     Patient Name: Vu Edge  : 1970  MRN: [id number]  Today's Date: 2023      Visit Date: 2023    Patient Active Problem List   Diagnosis   • Epicondylitis, lateral (tennis elbow)        Past Medical History:   Diagnosis Date   • Fracture of wrist         Past Surgical History:   Procedure Laterality Date   • CHOLECYSTECTOMY     • CHOLECYSTECTOMY     • TONSILLECTOMY     • TONSILLECTOMY     • WISDOM TOOTH EXTRACTION         Visit Dx:     ICD-10-CM ICD-9-CM   1. Neck pain  M54.2 723.1          Patient History     Row Name 23 0600             History    Chief Complaint Muscle weakness;Tinglings  -AS      Brief Description of Current Complaint Patient states that prior to  he was getting [place] decorations out when he started having left shoulder, neck, and upper back pain/tightness. He states he has numbness and tingling in his left hand/fingers. He states the pain in his neck/shoulder/back have improved but continues to have the numbness in left hand. Patient states he has had x-rays which show DDD. He states he did see a chiropractor which he feels helped some.  -AS      Previous treatment for THIS PROBLEM Chiropractor  -AS      Patient/Caregiver Goals Relieve pain;Return to prior level of function  -AS      Patient's Rating of General Health Very good  -AS      Hand Dominance ambidextrous  -AS      Occupation/sports/leisure   -AS      How has patient tried to help current problem? stretching, chiropractor  -AS      What clinical tests have you had for this problem? X-ray  -AS      Results of Clinical Tests DDD  -AS         Pain     Pain Location Arm;Hand  -AS      Pain at Present 3  -AS      Pain at Best 0  -AS      Pain at Worst 4  -AS      Pain Frequency Intermittent  -AS      Pain Description Aching;Numbness;Tingling  -AS      What Performance Factors Make the Current Problem(s)  WORSE? Use of LUE  -AS      What Performance Factors Make the Current Problem(s) BETTER? Rest  -AS         Daily Activities    Primary Language English  -AS      Are you able to read Yes  -AS      Are you able to write Yes  -AS      How does patient learn best? Listening;Reading;Demonstration  -AS      Teaching needs identified Home Exercise Program;Management of Condition  -AS      Patient is concerned about/has problems with Flexibility;Reaching over head;Repetitive movements of the hand, arm, shoulder  -AS      Does patient have problems with the following? None  -AS      Barriers to learning None  -AS      Pt Participated in POC and Goals Yes  -AS         Safety    Are you being hurt, hit, or frightened by anyone at home or in your life? No  -AS      Are you being neglected by a caregiver No  -AS            User Key  (r) = Recorded By, (t) = Taken By, (c) = Cosigned By    Initials Name Provider Type    AS Jesus Duran, PT Physical Therapist                 PT Ortho     Row Name 01/16/23 0600       Precautions and Contraindications    Precautions/Limitations no known precautions/limitations  -AS       Posture/Observations    Posture- WNL Posture is WNL  -AS       Cervical/Thoracic Special Tests    Cervical Compression (Forarminal Compression vs. Facet Pain) Left:;Positive  -AS    Cervical Distraction (Foraminal Compression vs. Facet Pain) Left:;Positive  -AS       Head/Neck/Trunk    Neck Extension AROM WNL  -AS    Neck Flexion AROM WNL  -AS    Neck Lt Lateral Flexion AROM WNL  -AS    Neck Rt Lateral Flexion AROM WNL  -AS    Neck Lt Rotation AROM WNL  -AS    Neck Rt Rotation AROM WNL  -AS       Left Upper Ext    Lt Shoulder Abduction AROM WNL  -AS    Lt Shoulder Flexion AROM WNL  -AS    Lt Shoulder External Rotation AROM WNL  -AS    Lt Shoulder Internal Rotation AROM WNL  -AS       MMT Neck/Trunk    Neck Flexion MMT, Gross Movement (4+/5) good plus  -AS    Neck Extension MMT, Gross Movement (4+/5) good  plus  -AS       MMT Left Upper Ext    Lt Shoulder Flexion MMT, Gross Movement (5/5) normal  -AS    Lt Shoulder ABduction MMT, Gross Movement (5/5) normal  -AS    Lt Shoulder Internal Rotation MMT, Gross Movement (5/5) normal  -AS    Lt Shoulder External Rotation MMT, Gross Movement (5/5) normal  -AS       Sensation    Sensation WNL? WNL  -AS    Light Touch No apparent deficits  -AS       Upper Extremity Flexibility    Upper Trapezius Left:;Mildly limited  -AS    Levator Scapula Left:;Mildly limited  -AS          User Key  (r) = Recorded By, (t) = Taken By, (c) = Cosigned By    Initials Name Provider Type    AS Jesus Duran, PT Physical Therapist                            Therapy Education  Given: HEP, Symptoms/condition management, Pain management  Program: New  How Provided: Verbal, Demonstration, Written  Provided to: Patient  Level of Understanding: Teach back education performed, Verbalized, Demonstrated      PT OP Goals     Row Name 01/16/23 0600          PT Short Term Goals    STG Date to Achieve 01/30/23  -AS     STG 1 Patient to demonstrate compliance with his initial HEP for flexibility, ROM and strengthening.  -AS     STG 2 Patient to report improved radicular symptoms into left UE by 50-75%.  -AS        Long Term Goals    LTG Date to Achieve 02/13/23  -AS     LTG 1 Patient to demonstrate compliance with his advanced HEP for flexibility, ROM and strengthening.  -AS     LTG 2 Patient to report improved radicular symptoms into left UE by %.  -AS     LTG 3 Patient to report improved function on Back Index to 0.  -AS        Time Calculation    PT Goal Re-Cert Due Date 02/13/23  -AS           User Key  (r) = Recorded By, (t) = Taken By, (c) = Cosigned By    Initials Name Provider Type    AS Jesus Duran, PT Physical Therapist                 PT Assessment/Plan     Row Name 01/16/23 0600          PT Assessment    Functional Limitations Performance in sport activities;Performance in  leisure activities  -AS     Impairments Impaired flexibility  -AS     Assessment Comments Patient presents to outpatient PT with biggest complaint being numbness and tingling into his left hand/fingers. Patient has good neck and LUE ROM and strength. He does have S/S of possible nerve compression which could be causing his UE symptoms. Cervical traction was performed today during evaluation and patient tolerated this well and reported improvements in his symptoms following treatment.  -AS     Please refer to paper survey for additional self-reported information Yes  -AS     Rehab Potential Good  -AS     Patient/caregiver participated in establishment of treatment plan and goals Yes  -AS     Patient would benefit from skilled therapy intervention Yes  -AS        PT Plan    PT Frequency 2x/week  -AS     Predicted Duration of Therapy Intervention (PT) 4 weeks  -AS     Planned CPT's? PT RE-EVAL: 21017;PT THER PROC EA 15 MIN: 67520;PT THER ACT EA 15 MIN: 65081;PT MANUAL THERAPY EA 15 MIN: 07377;PT NEUROMUSC RE-EDUCATION EA 15 MIN: 11821  -AS           User Key  (r) = Recorded By, (t) = Taken By, (c) = Cosigned By    Initials Name Provider Type    AS Jesus Duran, PT Physical Therapist                 Modalities     Row Name 01/16/23 0600             Traction 63123    Traction Type Cervical  -AS      PT Traction Rx Minutes 20  -AS      Duration Intermittent  -AS      Position Supine  -AS      Weight 18  -AS      Hold 60  -AS      Relax 10  -AS            User Key  (r) = Recorded By, (t) = Taken By, (c) = Cosigned By    Initials Name Provider Type    AS Jesus Duran, PT Physical Therapist               OP Exercises     Row Name 01/16/23 0600             Subjective Pain    Able to rate subjective pain? yes  -AS      Pre-Treatment Pain Level 3  -AS      Post-Treatment Pain Level 2  -AS         Exercise 1    Exercise Name 1 L UT Stretch  -AS      Reps 1 10  -AS      Time 1 10 sec hold each  -AS          Exercise 2    Exercise Name 2 L Levator Stretch  -AS      Reps 2 10  -AS      Time 2 10 sec hold each  -AS         Exercise 3    Exercise Name 3 Standing Upper Thoracic/Lower Cervical Stretch  -AS      Reps 3 10  -AS      Time 3 10 sec hold each  -AS            User Key  (r) = Recorded By, (t) = Taken By, (c) = Cosigned By    Initials Name Provider Type    AS Jesus Duran, PT Physical Therapist                              Outcome Measure Options: Other Outcome Measure  Other Outcome Measure Tool Used  Other Outcome Measure Tool Comments: Back Index - 6      Time Calculation:     Start Time: 0552  Stop Time: 0649  Time Calculation (min): 57 min  Untimed Charges  PT Traction Rx Minutes: 20  Total Minutes  Untimed Charges Total Minutes: 20   Total Minutes: 20     Therapy Charges for Today     Code Description Service Date Service Provider Modifiers Qty    46403221770 HC PT EVAL LOW COMPLEXITY 4 1/16/2023 Jesus Duran, PT GP 1          PT G-Codes  Outcome Measure Options: Other Outcome Measure         Jesus Duran, PT  1/16/2023

## 2023-01-19 ENCOUNTER — HOSPITAL ENCOUNTER (OUTPATIENT)
Dept: PHYSICAL THERAPY | Facility: HOSPITAL | Age: 53
Setting detail: THERAPIES SERIES
Discharge: HOME | End: 2023-01-19
Payer: COMMERCIAL

## 2023-01-19 DIAGNOSIS — M54.2 NECK PAIN: Primary | ICD-10-CM

## 2023-01-19 PROCEDURE — 97012 MECHANICAL TRACTION THERAPY: CPT

## 2023-01-19 NOTE — THERAPY TREATMENT NOTE
"    Outpatient Physical Therapy Ortho Treatment Note  ALEXEY Armando     Patient Name: Vu Edge  : 1970  MRN: [id number]  Today's Date: 2023      Visit Date: 2023    Visit Dx:    ICD-10-CM ICD-9-CM   1. Neck pain  M54.2 723.1       Patient Active Problem List   Diagnosis   • Epicondylitis, lateral (tennis elbow)        Past Medical History:   Diagnosis Date   • Fracture of wrist         Past Surgical History:   Procedure Laterality Date   • CHOLECYSTECTOMY     • CHOLECYSTECTOMY     • TONSILLECTOMY     • TONSILLECTOMY     • WISDOM TOOTH EXTRACTION                          PT Assessment/Plan     Row Name 23 06          PT Assessment    Assessment Comments Continued with cervical traction today as patient reports improvements in his symptoms following treatment. Patient tolerated his treatment session well today.  -AS        PT Plan    PT Plan Comments Continue with current treatment plan.  -AS           User Key  (r) = Recorded By, (t) = Taken By, (c) = Cosigned By    Initials Name Provider Type    AS Jesus Duran, PT Physical Therapist                 Modalities     Row Name 23 06             Traction 56680    Traction Type Cervical  -AS      PT Traction Rx Minutes 20  -AS      Duration Intermittent  -AS      Position Supine  -AS      Weight 20  -AS      Hold 60  -AS      Relax 10  -AS         Functional Testing    Outcome Measure Options Other Outcome Measure  -AS            User Key  (r) = Recorded By, (t) = Taken By, (c) = Cosigned By    Initials Name Provider Type    AS Jesus Duran, PT Physical Therapist               OP Exercises     Row Name 23 0600             Subjective Comments    Subjective Comments Patient states he feels like his fingers \"felt normal at times with less intense symptoms.\" He states \"whatever it is we are doing, I think it is working.\" Patient also reports the stretching \"feels good.\"  -AS         Exercise 1    Exercise Name 1 L " UT Stretch  -AS      Reps 1 10  -AS      Time 1 10 sec hold each  -AS         Exercise 2    Exercise Name 2 L Levator Stretch  -AS      Reps 2 10  -AS      Time 2 10 sec hold each  -AS         Exercise 3    Exercise Name 3 Standing Upper Thoracic/Lower Cervical Stretch  -AS      Reps 3 10  -AS      Time 3 10 sec hold each  -AS            User Key  (r) = Recorded By, (t) = Taken By, (c) = Cosigned By    Initials Name Provider Type    AS Jesus Duran, PT Physical Therapist                                      Outcome Measure Options: Other Outcome Measure         Time Calculation:   Start Time: 0551  Stop Time: 0628  Time Calculation (min): 37 min  Untimed Charges  PT Traction Rx Minutes: 20  Total Minutes  Untimed Charges Total Minutes: 20   Total Minutes: 20  Therapy Charges for Today     Code Description Service Date Service Provider Modifiers Qty    55910232002 HC PT TRACTION CERVICAL 1/19/2023 Jesus Duran, PT GP 1          PT G-Codes  Outcome Measure Options: Other Outcome Measure         Jesus Duran, JOHN  1/19/2023

## 2023-01-24 ENCOUNTER — HOSPITAL ENCOUNTER (OUTPATIENT)
Dept: PHYSICAL THERAPY | Facility: HOSPITAL | Age: 53
Setting detail: THERAPIES SERIES
Discharge: HOME | End: 2023-01-24
Payer: COMMERCIAL

## 2023-01-24 DIAGNOSIS — M54.2 NECK PAIN: Primary | ICD-10-CM

## 2023-01-24 PROCEDURE — 97012 MECHANICAL TRACTION THERAPY: CPT

## 2023-01-24 NOTE — THERAPY TREATMENT NOTE
"    Outpatient Physical Therapy Ortho Treatment Note  ALEXEY Armando     Patient Name: Vu Edge  : 1970  MRN: [id number]  Today's Date: 2023      Visit Date: 2023    Visit Dx:    ICD-10-CM ICD-9-CM   1. Neck pain  M54.2 723.1       Patient Active Problem List   Diagnosis   • Epicondylitis, lateral (tennis elbow)        Past Medical History:   Diagnosis Date   • Fracture of wrist         Past Surgical History:   Procedure Laterality Date   • CHOLECYSTECTOMY     • CHOLECYSTECTOMY     • TONSILLECTOMY     • TONSILLECTOMY     • WISDOM TOOTH EXTRACTION                          PT Assessment/Plan     Row Name 23 0500          PT Assessment    Assessment Comments Continued with cervical traction today as patient reports improvements in his symptoms following treatment. Patient tolerated his treatment session well today.  -AS        PT Plan    PT Plan Comments Continue with current treatment plan.  -AS           User Key  (r) = Recorded By, (t) = Taken By, (c) = Cosigned By    Initials Name Provider Type    AS Jesus Duran, PT Physical Therapist                 Modalities     Row Name 23 0500             Traction 42302    Traction Type Cervical  -AS      PT Traction Rx Minutes 20  -AS      Duration Intermittent  -AS      Position Supine  -AS      Weight 20  -AS      Hold 60  -AS      Relax 10  -AS         Functional Testing    Outcome Measure Options Other Outcome Measure  -AS            User Key  (r) = Recorded By, (t) = Taken By, (c) = Cosigned By    Initials Name Provider Type    AS Jesus Duran, PT Physical Therapist               OP Exercises     Row Name 23 0500             Subjective Comments    Subjective Comments Patient states he is doing better overall. He states there are times where his fingers and hand \"feel normal.\" He states he does get some tingling in his hand when he is slumped forward.  -AS         Exercise 1    Exercise Name 1 L UT Stretch  -AS   "    Reps 1 10  -AS      Time 1 10 sec hold each  -AS         Exercise 2    Exercise Name 2 L Levator Stretch  -AS      Reps 2 10  -AS      Time 2 10 sec hold each  -AS         Exercise 3    Exercise Name 3 Standing Upper Thoracic/Lower Cervical Stretch  -AS      Reps 3 10  -AS      Time 3 10 sec hold each  -AS            User Key  (r) = Recorded By, (t) = Taken By, (c) = Cosigned By    Initials Name Provider Type    AS Jesus Duran, PT Physical Therapist                                      Outcome Measure Options: Other Outcome Measure         Time Calculation:   Start Time: 0553  Stop Time: 0620  Time Calculation (min): 27 min  Untimed Charges  PT Traction Rx Minutes: 20  Total Minutes  Untimed Charges Total Minutes: 20   Total Minutes: 20  Therapy Charges for Today     Code Description Service Date Service Provider Modifiers Qty    37707170499 HC PT TRACTION CERVICAL 1/24/2023 Jesus Duran, PT GP 1          PT G-Codes  Outcome Measure Options: Other Outcome Measure         Jesus Duran, PT  1/24/2023

## 2023-01-26 ENCOUNTER — HOSPITAL ENCOUNTER (OUTPATIENT)
Dept: PHYSICAL THERAPY | Facility: HOSPITAL | Age: 53
Setting detail: THERAPIES SERIES
Discharge: HOME | End: 2023-01-26
Payer: COMMERCIAL

## 2023-01-26 DIAGNOSIS — M54.2 NECK PAIN: Primary | ICD-10-CM

## 2023-01-26 PROCEDURE — 97012 MECHANICAL TRACTION THERAPY: CPT

## 2023-01-26 NOTE — THERAPY TREATMENT NOTE
Outpatient Physical Therapy Ortho Treatment Note  ALEXEY Armando     Patient Name: Vu Edge  : 1970  MRN: [id number]  Today's Date: 2023      Visit Date: 2023    Visit Dx:    ICD-10-CM ICD-9-CM   1. Neck pain  M54.2 723.1       Patient Active Problem List   Diagnosis   • Epicondylitis, lateral (tennis elbow)        Past Medical History:   Diagnosis Date   • Fracture of wrist         Past Surgical History:   Procedure Laterality Date   • CHOLECYSTECTOMY     • CHOLECYSTECTOMY     • TONSILLECTOMY     • TONSILLECTOMY     • WISDOM TOOTH EXTRACTION                          PT Assessment/Plan     Row Name 23          PT Assessment    Assessment Comments Continued with cervical traction today as patient reports improvements in his symptoms following treatment. Patient tolerated his treatment session well today.  -AS        PT Plan    PT Plan Comments Continue with current treatment plan.  -AS           User Key  (r) = Recorded By, (t) = Taken By, (c) = Cosigned By    Initials Name Provider Type    AS Jesus Duran, PT Physical Therapist                 Modalities     Row Name 23             Traction 13136    Traction Type Cervical  -AS      PT Traction Rx Minutes 20  -AS      Duration Intermittent  -AS      Position Supine  -AS      Weight 20  -AS      Hold 60  -AS      Relax 10  -AS         Functional Testing    Outcome Measure Options Other Outcome Measure  -AS            User Key  (r) = Recorded By, (t) = Taken By, (c) = Cosigned By    Initials Name Provider Type    AS Jesus Duran, PT Physical Therapist               OP Exercises     Row Name 23 06             Subjective Comments    Subjective Comments Patient states he continues to do well and the cervical traction and HEP seem to continue to improve his symptoms.  -AS         Exercise 1    Exercise Name 1 L UT Stretch  -AS      Reps 1 10  -AS      Time 1 10 sec hold each  -AS         Exercise 2     Exercise Name 2 L Levator Stretch  -AS      Reps 2 10  -AS      Time 2 10 sec hold each  -AS         Exercise 3    Exercise Name 3 Standing Upper Thoracic/Lower Cervical Stretch  -AS      Reps 3 10  -AS      Time 3 10 sec hold each  -AS            User Key  (r) = Recorded By, (t) = Taken By, (c) = Cosigned By    Initials Name Provider Type    AS Jesus Duran, PT Physical Therapist                                      Outcome Measure Options: Other Outcome Measure         Time Calculation:   Start Time: 0603  Stop Time: 0627  Time Calculation (min): 24 min  Untimed Charges  PT Traction Rx Minutes: 20  Total Minutes  Untimed Charges Total Minutes: 20   Total Minutes: 20  Therapy Charges for Today     Code Description Service Date Service Provider Modifiers Qty    56231852186 HC PT TRACTION CERVICAL 1/26/2023 Jesus Duran, PT GP 1          PT G-Codes  Outcome Measure Options: Other Outcome Measure         Jesus Duran, PT  1/26/2023

## 2023-01-31 ENCOUNTER — HOSPITAL ENCOUNTER (OUTPATIENT)
Dept: PHYSICAL THERAPY | Facility: HOSPITAL | Age: 53
Setting detail: THERAPIES SERIES
Discharge: HOME | End: 2023-01-31
Payer: COMMERCIAL

## 2023-01-31 DIAGNOSIS — M54.2 NECK PAIN: Primary | ICD-10-CM

## 2023-01-31 PROCEDURE — 97012 MECHANICAL TRACTION THERAPY: CPT

## 2023-02-02 ENCOUNTER — HOSPITAL ENCOUNTER (OUTPATIENT)
Dept: PHYSICAL THERAPY | Facility: HOSPITAL | Age: 53
Setting detail: THERAPIES SERIES
Discharge: HOME | End: 2023-02-02
Payer: COMMERCIAL

## 2023-02-02 DIAGNOSIS — M54.2 NECK PAIN: Primary | ICD-10-CM

## 2023-02-02 PROCEDURE — 97012 MECHANICAL TRACTION THERAPY: CPT

## 2023-02-02 NOTE — THERAPY TREATMENT NOTE
Outpatient Physical Therapy Ortho Treatment Note  ALEXEY Armando     Patient Name: Vu Edge  : 1970  MRN: [id number]  Today's Date: 2023      Visit Date: 2023    Visit Dx:    ICD-10-CM ICD-9-CM   1. Neck pain  M54.2 723.1       Patient Active Problem List   Diagnosis   • Epicondylitis, lateral (tennis elbow)        Past Medical History:   Diagnosis Date   • Fracture of wrist         Past Surgical History:   Procedure Laterality Date   • CHOLECYSTECTOMY     • CHOLECYSTECTOMY     • TONSILLECTOMY     • TONSILLECTOMY     • WISDOM TOOTH EXTRACTION                          PT Assessment/Plan     Row Name 23 06          PT Assessment    Assessment Comments Continued with cervical traction today as patient reports improvements in his symptoms following treatment. Patient tolerated his treatment session well today.  -AS        PT Plan    PT Plan Comments Continue with current treatment plan.  -AS           User Key  (r) = Recorded By, (t) = Taken By, (c) = Cosigned By    Initials Name Provider Type    AS Jesus Duran, PT Physical Therapist                 Modalities     Row Name 23             Traction 35702    Traction Type Cervical  -AS      PT Traction Rx Minutes 20  -AS      Duration Intermittent  -AS      Position Supine  -AS      Weight 20  -AS      Hold 60  -AS      Relax 10  -AS         Functional Testing    Outcome Measure Options Other Outcome Measure  -AS            User Key  (r) = Recorded By, (t) = Taken By, (c) = Cosigned By    Initials Name Provider Type    AS Jesus Duran, PT Physical Therapist               OP Exercises     Row Name 23 06             Subjective Comments    Subjective Comments Patient states he continues to do well and feels he is close to being finished with PT.  -AS         Exercise 1    Exercise Name 1 L UT Stretch  -AS      Reps 1 10  -AS      Time 1 10 sec hold each  -AS         Exercise 2    Exercise Name 2 L Levator  Stretch  -AS      Reps 2 10  -AS      Time 2 10 sec hold each  -AS         Exercise 3    Exercise Name 3 Standing Upper Thoracic/Lower Cervical Stretch  -AS      Reps 3 10  -AS      Time 3 10 sec hold each  -AS            User Key  (r) = Recorded By, (t) = Taken By, (c) = Cosigned By    Initials Name Provider Type    AS Jesus Duran, PT Physical Therapist                                      Outcome Measure Options: Other Outcome Measure         Time Calculation:   Start Time: 0600  Stop Time: 0628  Time Calculation (min): 28 min  Untimed Charges  PT Traction Rx Minutes: 20  Total Minutes  Untimed Charges Total Minutes: 20   Total Minutes: 20  Therapy Charges for Today     Code Description Service Date Service Provider Modifiers Qty    28933600748 HC PT TRACTION CERVICAL 2/2/2023 Jesus Duran, PT GP 1          PT G-Codes  Outcome Measure Options: Other Outcome Measure         Jesus Duran, PT  2/2/2023

## 2023-02-07 ENCOUNTER — HOSPITAL ENCOUNTER (OUTPATIENT)
Dept: PHYSICAL THERAPY | Facility: HOSPITAL | Age: 53
Setting detail: THERAPIES SERIES
Discharge: HOME | End: 2023-02-07
Payer: COMMERCIAL

## 2023-02-07 DIAGNOSIS — M54.2 NECK PAIN: Primary | ICD-10-CM

## 2023-02-07 PROCEDURE — 97012 MECHANICAL TRACTION THERAPY: CPT

## 2023-02-07 NOTE — THERAPY TREATMENT NOTE
Outpatient Physical Therapy Ortho Treatment Note  ALEXEY Armando     Patient Name: Vu Edge  : 1970  MRN: [id number]  Today's Date: 2023      Visit Date: 2023    Visit Dx:    ICD-10-CM ICD-9-CM   1. Neck pain  M54.2 723.1       Patient Active Problem List   Diagnosis   • Epicondylitis, lateral (tennis elbow)        Past Medical History:   Diagnosis Date   • Fracture of wrist         Past Surgical History:   Procedure Laterality Date   • CHOLECYSTECTOMY     • CHOLECYSTECTOMY     • TONSILLECTOMY     • TONSILLECTOMY     • WISDOM TOOTH EXTRACTION                          PT Assessment/Plan     Row Name 23          PT Assessment    Assessment Comments Patient has responded very well to PT. Plan to D/C patient onto a HEP only at this time.  -AS        PT Plan    PT Plan Comments D/C patient at this time as he no lomger has symptoms. Plan for patient to continue with cervical stretching as needed.  -AS           User Key  (r) = Recorded By, (t) = Taken By, (c) = Cosigned By    Initials Name Provider Type    AS Jesus Duran, PT Physical Therapist                 Modalities     Row Name 23             Traction 61539    Traction Type Cervical  -AS      PT Traction Rx Minutes 20  -AS      Duration Intermittent  -AS      Position Supine  -AS      Weight 20  -AS      Hold 60  -AS      Relax 10  -AS         Functional Testing    Outcome Measure Options Other Outcome Measure  -AS            User Key  (r) = Recorded By, (t) = Taken By, (c) = Cosigned By    Initials Name Provider Type    AS Jesus Duran, PT Physical Therapist               OP Exercises     Row Name 23             Subjective Comments    Subjective Comments Patient states he is doing very well and feels this is his last visit as he no longer has any issues.  -AS            User Key  (r) = Recorded By, (t) = Taken By, (c) = Cosigned By    Initials Name Provider Type    AS Jesus Duran,  PT Physical Therapist                                      Outcome Measure Options: Other Outcome Measure         Time Calculation:   Start Time: 0554  Stop Time: 0623  Time Calculation (min): 29 min  Untimed Charges  PT Traction Rx Minutes: 20  Total Minutes  Untimed Charges Total Minutes: 20   Total Minutes: 20  Therapy Charges for Today     Code Description Service Date Service Provider Modifiers Qty    84363583342 HC PT TRACTION CERVICAL 2/7/2023 Jesus Duran, PT GP 1          PT G-Codes  Outcome Measure Options: Other Outcome Measure         Jesus Duran, PT  2/7/2023

## 2023-05-15 ENCOUNTER — TELEPHONE (OUTPATIENT)
Dept: GASTROENTEROLOGY | Facility: CLINIC | Age: 53
End: 2023-05-15
Payer: COMMERCIAL

## 2023-05-18 NOTE — TELEPHONE ENCOUNTER
Called patient to make aware received fast track.  He would like to be scheduled with Dr. Anderson in September at the [place] location.  If Dr. Prescott has sooner availability then would like to be scheduled with him.

## 2023-05-19 NOTE — TELEPHONE ENCOUNTER
Spoke with patient.  He would like Friday morning.  Explained looking into 10/2023.    Scheduled at [place] 10/06/2023 at 8:45am - arrive 7:30am.  E-mail and mail instructions 2 months prior.

## 2023-05-19 NOTE — TELEPHONE ENCOUNTER
FAST TRACK - REFERRAL  1ST COLONOSCOPY  SCREENING  DID NOT [facility]  SCHEDULE AT [facility].

## 2023-05-23 DIAGNOSIS — Z12.11 ENCOUNTER FOR SCREENING FOR MALIGNANT NEOPLASM OF COLON: Primary | ICD-10-CM

## 2023-10-05 RX ORDER — TADALAFIL 10 MG/1
TABLET ORAL
COMMUNITY
Start: 2021-03-29

## 2023-10-05 NOTE — SIGNIFICANT NOTE
Education provided the Patient on the following:    - Nothing to Eat or Drink after MN the night before the procedure    - Avoid red/purple fluids while completing their bowel prep as ordered by physician  -Contact Gastrointerologist office for any questions about specific details regarding colon prep    -You will need to have someone drive you home after your colonoscopy and remain with you for 24 hours after the procedure  - The date of your Surgery, you may have one visitor at bedside or within 10-15 minutes of [facility]  -Please wear warm socks when you arrive for your colonoscopy  -Remove all jewelry and leave any valuables before arriving the day of your procedure (all will have to be removed before leaving preop)  -You will need to arrive at 0745 on 10-6-23 for your colonoscopy    -Feel free to contact us at: [phone] with any additional questions/concerns

## 2023-10-06 ENCOUNTER — ANESTHESIA EVENT (OUTPATIENT)
Dept: SURGERY | Facility: SURGERY CENTER | Age: 53
End: 2023-10-06
Payer: COMMERCIAL

## 2023-10-06 ENCOUNTER — HOSPITAL ENCOUNTER (OUTPATIENT)
Facility: SURGERY CENTER | Age: 53
Setting detail: HOSPITAL OUTPATIENT SURGERY
Discharge: HOME | End: 2023-10-06
Attending: INTERNAL MEDICINE | Admitting: INTERNAL MEDICINE
Payer: COMMERCIAL

## 2023-10-06 ENCOUNTER — ANESTHESIA (OUTPATIENT)
Dept: SURGERY | Facility: SURGERY CENTER | Age: 53
End: 2023-10-06
Payer: COMMERCIAL

## 2023-10-06 VITALS
BODY MASS INDEX: 28.28 KG/M2 | DIASTOLIC BLOOD PRESSURE: 57 MMHG | SYSTOLIC BLOOD PRESSURE: 104 MMHG | HEIGHT: 71 IN | TEMPERATURE: 98.4 F | HEART RATE: 78 BPM | OXYGEN SATURATION: 97 % | WEIGHT: 202 LBS | RESPIRATION RATE: 16 BRPM

## 2023-10-06 DIAGNOSIS — Z12.11 ENCOUNTER FOR SCREENING FOR MALIGNANT NEOPLASM OF COLON: ICD-10-CM

## 2023-10-06 PROCEDURE — 25010000002 PROPOFOL 1000 MG/100ML EMULSION: Performed by: STUDENT IN AN ORGANIZED HEALTH CARE EDUCATION/TRAINING PROGRAM

## 2023-10-06 PROCEDURE — 88305 TISSUE EXAM BY PATHOLOGIST: CPT | Performed by: INTERNAL MEDICINE

## 2023-10-06 PROCEDURE — 45380 COLONOSCOPY AND BIOPSY: CPT | Performed by: INTERNAL MEDICINE

## 2023-10-06 PROCEDURE — 0 LIDOCAINE 1 % SOLUTION: Performed by: STUDENT IN AN ORGANIZED HEALTH CARE EDUCATION/TRAINING PROGRAM

## 2023-10-06 PROCEDURE — 25810000003 LACTATED RINGERS PER 1000 ML: Performed by: INTERNAL MEDICINE

## 2023-10-06 RX ORDER — SODIUM CHLORIDE 0.9 % (FLUSH) 0.9 %
10 SYRINGE (ML) INJECTION AS NEEDED
Status: DISCONTINUED | OUTPATIENT
Start: 2023-10-06 | End: 2023-10-06 | Stop reason: HOSPADM

## 2023-10-06 RX ORDER — LIDOCAINE HYDROCHLORIDE 10 MG/ML
INJECTION, SOLUTION INFILTRATION; PERINEURAL AS NEEDED
Status: DISCONTINUED | OUTPATIENT
Start: 2023-10-06 | End: 2023-10-06 | Stop reason: SURG

## 2023-10-06 RX ORDER — LIDOCAINE HYDROCHLORIDE 10 MG/ML
0.5 INJECTION, SOLUTION INFILTRATION; PERINEURAL ONCE AS NEEDED
Status: DISCONTINUED | OUTPATIENT
Start: 2023-10-06 | End: 2023-10-06 | Stop reason: HOSPADM

## 2023-10-06 RX ORDER — SODIUM CHLORIDE, SODIUM LACTATE, POTASSIUM CHLORIDE, CALCIUM CHLORIDE 600; 310; 30; 20 MG/100ML; MG/100ML; MG/100ML; MG/100ML
1000 INJECTION, SOLUTION INTRAVENOUS CONTINUOUS
Status: DISCONTINUED | OUTPATIENT
Start: 2023-10-06 | End: 2023-10-06 | Stop reason: HOSPADM

## 2023-10-06 RX ORDER — PROPOFOL 10 MG/ML
INJECTION, EMULSION INTRAVENOUS AS NEEDED
Status: DISCONTINUED | OUTPATIENT
Start: 2023-10-06 | End: 2023-10-06 | Stop reason: SURG

## 2023-10-06 RX ADMIN — SODIUM CHLORIDE, POTASSIUM CHLORIDE, SODIUM LACTATE AND CALCIUM CHLORIDE 1000 ML: 600; 310; 30; 20 INJECTION, SOLUTION INTRAVENOUS at 08:23

## 2023-10-06 RX ADMIN — PROPOFOL 50 MG: 10 INJECTION, EMULSION INTRAVENOUS at 08:51

## 2023-10-06 RX ADMIN — LIDOCAINE HYDROCHLORIDE 30 MG: 10 INJECTION, SOLUTION INFILTRATION; PERINEURAL at 08:49

## 2023-10-06 RX ADMIN — PROPOFOL 140 MCG/KG/MIN: 10 INJECTION, EMULSION INTRAVENOUS at 08:53

## 2023-10-06 RX ADMIN — PROPOFOL 70 MG: 10 INJECTION, EMULSION INTRAVENOUS at 08:49

## 2023-10-06 NOTE — ANESTHESIA POSTPROCEDURE EVALUATION
Patient: Vu Edge    Procedure Summary       Date: 10/06/23 Room / Location: [facility]    Anesthesia Start: 0843 Anesthesia Stop: 0919    Procedure: COLONOSCOPY to Cecum with Polypectomy Diagnosis:       Encounter for screening for malignant neoplasm of colon      Diverticulosis      Colon polyp      (Encounter for screening for malignant neoplasm of colon [Z12.11])    Surgeons: Porter Anderson MD Provider: Geri Hernandez MD    Anesthesia Type: MAC ASA Status: 1            Anesthesia Type: MAC    Vitals  Vitals Value Taken Time   /57 10/06/23 0932   Temp 36.9 °C (98.4 °F) 10/06/23 0920   Pulse 70 10/06/23 0937   Resp 16 10/06/23 0930   SpO2 97 % 10/06/23 0937   Vitals shown include unvalidated device data.        Post Anesthesia Care and Evaluation    Patient location during evaluation: PHASE II  Level of consciousness: awake  Pain management: adequate    Airway patency: patent  Anesthetic complications: No anesthetic complications    Cardiovascular status: acceptable  Respiratory status: acceptable  Hydration status: acceptable

## 2023-10-06 NOTE — ANESTHESIA PREPROCEDURE EVALUATION
" Anesthesia Evaluation     Patient summary reviewed and Nursing notes reviewed   no history of anesthetic complications:   NPO Solid Status: > 8 hours  NPO Liquid Status: > 2 hours           Airway   Mallampati: II  TM distance: <3 FB  Neck ROM: full  Possible difficult intubation  Dental - normal exam     Pulmonary - normal exam   (-) COPD, asthma  Cardiovascular   Exercise tolerance: good (4-7 METS)    Rhythm: regular    (-) past MI, angina, cardiac stents      Neuro/Psych  (-) seizures, CVA  GI/Hepatic/Renal/Endo    (-) GERD, liver disease, no renal disease    Musculoskeletal     Abdominal    Substance History - negative use     OB/GYN          Other   arthritis,                     Anesthesia Plan    ASA 1     MAC     (I have reviewed the patient's history and chart with the patient, including all pertinent laboratory results and imaging. I have explained the risks of monitored anesthesia care including but not limited to respiratory depression, possible need for airway intervention, or possible intra-op recall. I explained that airway intervention involves risk of possible dental injury.    VITALS:  /72 (BP Location: Left arm, Patient Position: Sitting)   Pulse 80   Temp 36.3 °C (97.3 °F) (Temporal)   Resp 16   Ht 180.3 cm (71\")   Wt 91.6 kg (202 lb)   SpO2 98%   BMI 28.17 kg/m² )  intravenous induction     Anesthetic plan, risks, benefits, and alternatives have been provided, discussed and informed consent has been obtained with: patient.  Pre-procedure education provided    CODE STATUS:         "

## 2023-10-06 NOTE — H&P
"Patient Care Team:  Gita Campos APRN as PCP - General (Family Medicine)    CHIEF COMPLAINT: Screening CRC    HISTORY OF PRESENT ILLNESS:  First exam     Past Medical History:   Diagnosis Date    Fracture of wrist      Past Surgical History:   Procedure Laterality Date    CHOLECYSTECTOMY      CHOLECYSTECTOMY      TONSILLECTOMY      TONSILLECTOMY      WISDOM TOOTH EXTRACTION       Family History   Problem Relation Age of Onset    Hypertension Father     Heart disease Father      Social History     Tobacco Use    Smoking status: Never   Substance Use Topics    Alcohol use: No     Medications Prior to Admission   Medication Sig Dispense Refill Last Dose    tadalafil (CIALIS) 10 MG tablet        TESTOSTERONE BU         Allergies:  Patient has no known allergies.    REVIEW OF SYSTEMS:  Please see the above history of present illness for pertinent positives and negatives.  The remainder of the patient's systems have been reviewed and are negative.     Vital Signs       Flowsheet Rows      Flowsheet Row First Filed Value   Admission Height 180.3 cm (71\") Documented at 10/05/2023 1201   Admission Weight 93 kg (205 lb) Documented at 10/05/2023 1201             Physical Exam:  Physical Exam   Constitutional: Patient appears well-developed and well-nourished and in no acute distress   HEENT:   Head: Normocephalic and atraumatic.   Eyes:  Pupils are equal, round, and reactive to light. EOM are intact. Sclerae are anicteric and non-injected.  Mouth and Throat: Patient has moist mucous membranes. Oropharynx is clear of any erythema or exudate.     Neck: Neck supple. No JVD present. No thyromegaly present. No lymphadenopathy present.  Cardiovascular: Regular rate, regular rhythm, S1 normal and S2 normal.  Exam reveals no gallop and no friction rub.  No murmur heard.  Pulmonary/Chest: Lungs are clear to auscultation bilaterally. No respiratory distress. No wheezes. No rhonchi. No rales.   Abdominal: Soft. Bowel sounds are normal. " No distension and no mass. There is no hepatosplenomegaly. There is no tenderness.   Musculoskeletal: Normal Muscle tone  Extremities: No edema. Pulses are palpable in all 4 extremities.  Neurological: Patient is alert and oriented to person, place, and time. Cranial nerves II-XII are grossly intact with no focal deficits.  Skin: Skin is warm. No rash noted. Nails show no clubbing.  No cyanosis or erythema.    Debilities/Disabilities Identified: None  Emotional Behavior: Appropriate     Results Review:   I reviewed the patient's new clinical results.    Lab Results (most recent)       None            Imaging Results (Most Recent)       None          reviewed    ECG/EMG Results (most recent)       None          reviewed    Assessment & Plan   Screening CRC/  colonoscopy      I discussed the patient's findings and my recommendations with patient.     Porter Anderson MD  10/06/23  08:02 EDT    Time: 10 min prior to procedure.

## 2023-10-06 NOTE — BRIEF OP NOTE
COLONOSCOPY  Progress Note    Vu Edge  10/6/2023    Pre-op Diagnosis:   Encounter for screening for malignant neoplasm of colon [Z12.11]       Post-Op Diagnosis Codes:     * Encounter for screening for malignant neoplasm of colon [Z12.11]     * Diverticulosis [K57.90]     * Colon polyp [K63.5]    Procedure/CPT® Codes:        Procedure(s):  COLONOSCOPY to Cecum with Polypectomy              Surgeon(s):  Porter Anderson MD    Anesthesia: Monitored Anesthesia Care    Staff:   Endo Technician: Elizabeth Morales, RN  Endo Nurse: Kenna Greene RN         Estimated Blood Loss: none    Urine Voided: * No values recorded between 10/6/2023  8:43 AM and 10/6/2023  9:17 AM *    Specimens:                Specimens       ID Source Type Tests Collected By Collected At Frozen?    A Large Intestine, Cecum Tissue TISSUE PATHOLOGY EXAM   Porter Anderson MD 10/6/23 0900 No    Description: Cecal Polyp    This specimen was not marked as sent.    B Large Intestine, Right / Ascending Colon Tissue TISSUE PATHOLOGY EXAM   Porter Anderson MD 10/6/23 0904 No    Description: Ascending Polyp    This specimen was not marked as sent.    C Large Intestine, Transverse Colon Tissue TISSUE PATHOLOGY EXAM   Porter Anderson MD 10/6/23 0910 No    Description: Transverse Polyp    This specimen was not marked as sent.                  Drains: * No LDAs found *    Findings: Colon to Cecum Good Prep  Polyps-3-Biopsy  Sigmoid Diverticulosis        Complications: None          Porter Anderson MD     Date: 10/6/2023  Time: 09:18 EDT

## 2023-10-09 LAB
LAB AP CASE REPORT: NORMAL
PATH REPORT.FINAL DX SPEC: NORMAL
PATH REPORT.GROSS SPEC: NORMAL

## 2025-07-18 ENCOUNTER — TRANSCRIBE ORDERS (OUTPATIENT)
Dept: ADMINISTRATIVE | Facility: HOSPITAL | Age: 55
End: 2025-07-18
Payer: COMMERCIAL

## 2025-07-18 DIAGNOSIS — R06.09 OTHER FORM OF DYSPNEA: Primary | ICD-10-CM

## (undated) DEVICE — SINGLE-USE BIOPSY FORCEPS: Brand: RADIAL JAW 4

## (undated) DEVICE — VIAL FORMLN CAP 10PCT 20ML

## (undated) DEVICE — SYRINGE, LUER SLIP, STERILE, 60ML: Brand: MEDLINE

## (undated) DEVICE — ADAPT CLN SCPE ENDO PORPOISE BX/50 DISP

## (undated) DEVICE — JACKT LAB F/R KNIT CUFF/COLR XLG BLU

## (undated) DEVICE — CANN O2 ETCO2 FITS ALL CONN CO2 SMPL A/ 7IN DISP LF

## (undated) DEVICE — Device

## (undated) DEVICE — GOWN ISOL W/THUMB UNIV BLU BX/15

## (undated) DEVICE — FLEX ADVANTAGE 1500CC: Brand: FLEX ADVANTAGE

## (undated) DEVICE — KT ORCA ORCAPOD DISP STRL